# Patient Record
(demographics unavailable — no encounter records)

---

## 2025-01-31 NOTE — PHYSICAL EXAM
[No Acute Distress] : no acute distress [Well-Appearing] : well-appearing [No Respiratory Distress] : no respiratory distress  [No Accessory Muscle Use] : no accessory muscle use [Clear to Auscultation] : lungs were clear to auscultation bilaterally [Normal Rate] : normal rate  [Regular Rhythm] : with a regular rhythm [Normal S1, S2] : normal S1 and S2

## 2025-01-31 NOTE — HISTORY OF PRESENT ILLNESS
[FreeTextEntry1] : follow up [de-identified] : 61F w/ MDD, BPD, trigeminal neuralgia, HTN, Ovarian cancer in 2010 s/p SARAH SPO coming in for follow up visit. She is overall feeling well and doing well has no complaints today.   #HTN  -BP has been <140s at home  -No interval ED visit or hospitalization, no headaches dizziness chest pain   #MDD  -Mood has been good, seeing therapist and enjoys her work. Has children in the area   #HCM -Did not schedule an appointment with GI or Podiatry as patient did not really have the motivation to follow up. She states she will give them a call.

## 2025-01-31 NOTE — ASSESSMENT
[FreeTextEntry1] : 61F w/ MDD, BPD, trigeminal neuralgia, HTN, Ovarian cancer in 2010 s/p SARAH SPO coming in for follow up visit. She is overall feeling well and doing well has no complaints today.   #HTN  -BP has been <140s at home  -No interval ED visit or hospitalization, no headaches dizziness chest pain  -continue monitoring off meds   #HLD  -Total cholesterol and LDL elevated  -counseled on diet and exercise  -ASCVD risk <5% will continue w/ lifestyle modification   #MDD  -Mood has been good, seeing therapist and enjoys her work. Has children in area for support.  -c/w Seroquel and Fluoxetine   #HCM -Referral given to GI Podiatry and Mammogram  -Up to date on Pap smear; negative pap and HPV in 2023  -up to date Tdap 2024 and PCV 2019  -Discuss shingles vaccine next visit   -declined flu shot today   f/u in 6mo for CPE   d/w Dr. Aquino